# Patient Record
Sex: MALE | Race: WHITE | NOT HISPANIC OR LATINO | Employment: STUDENT | URBAN - METROPOLITAN AREA
[De-identification: names, ages, dates, MRNs, and addresses within clinical notes are randomized per-mention and may not be internally consistent; named-entity substitution may affect disease eponyms.]

---

## 2019-06-03 ENCOUNTER — OFFICE VISIT (OUTPATIENT)
Dept: RETAIL CLINIC | Facility: CLINIC | Age: 24
End: 2019-06-03

## 2019-06-03 VITALS
OXYGEN SATURATION: 98 % | RESPIRATION RATE: 20 BRPM | TEMPERATURE: 99.3 F | HEIGHT: 67 IN | WEIGHT: 167.8 LBS | BODY MASS INDEX: 26.34 KG/M2 | HEART RATE: 92 BPM

## 2019-06-03 DIAGNOSIS — J02.0 STREP THROAT: Primary | ICD-10-CM

## 2019-06-03 LAB
EXPIRATION DATE: ABNORMAL
EXPIRATION DATE: NORMAL
HETEROPH AB SER QL LA: NEGATIVE
INTERNAL CONTROL: ABNORMAL
INTERNAL CONTROL: NORMAL
Lab: ABNORMAL
Lab: NORMAL
S PYO AG THROAT QL: POSITIVE

## 2019-06-03 PROCEDURE — 87880 STREP A ASSAY W/OPTIC: CPT | Performed by: NURSE PRACTITIONER

## 2019-06-03 PROCEDURE — 86308 HETEROPHILE ANTIBODY SCREEN: CPT | Performed by: NURSE PRACTITIONER

## 2019-06-03 PROCEDURE — 99213 OFFICE O/P EST LOW 20 MIN: CPT | Performed by: NURSE PRACTITIONER

## 2019-06-03 RX ORDER — METHYLPREDNISOLONE 4 MG/1
TABLET ORAL
Qty: 21 TABLET | Refills: 0 | Status: SHIPPED | OUTPATIENT
Start: 2019-06-03

## 2019-06-03 RX ORDER — AMOXICILLIN 500 MG/1
1000 CAPSULE ORAL 2 TIMES DAILY
Qty: 40 CAPSULE | Refills: 0 | Status: SHIPPED | OUTPATIENT
Start: 2019-06-03 | End: 2019-06-13

## 2019-06-03 RX ORDER — BROMPHENIRAMINE MALEATE, PSEUDOEPHEDRINE HYDROCHLORIDE, AND DEXTROMETHORPHAN HYDROBROMIDE 2; 30; 10 MG/5ML; MG/5ML; MG/5ML
10 SYRUP ORAL 4 TIMES DAILY PRN
Qty: 200 ML | Refills: 0 | Status: SHIPPED | OUTPATIENT
Start: 2019-06-03 | End: 2019-06-08

## 2019-06-03 NOTE — PROGRESS NOTES
Subjective   Chief Complaint   Patient presents with   • Sore Throat       Genaro Reich is a 23 y.o. male.     Friend that was diagnosed with mono 2 days ago.       URI    This is a new problem. Episode onset: 3 days. The problem has been unchanged. Maximum temperature: 104. Associated symptoms include coughing (productive), headaches, joint pain and a sore throat. Pertinent negatives include no abdominal pain, congestion, diarrhea, nausea, rhinorrhea, sinus pain, sneezing, swollen glands, vomiting or wheezing. He has tried acetaminophen (aspirin, mucinex dm) for the symptoms. The treatment provided mild relief.        No Known Allergies    History reviewed. No pertinent past medical history.    History reviewed. No pertinent surgical history.    Social History     Socioeconomic History   • Marital status: Single     Spouse name: Not on file   • Number of children: Not on file   • Years of education: Not on file   • Highest education level: Not on file   Tobacco Use   • Smoking status: Never Smoker   Substance and Sexual Activity   • Alcohol use: No     Frequency: Never   • Drug use: No   • Sexual activity: Defer       Family History   Problem Relation Age of Onset   • No Known Problems Mother    • No Known Problems Father          Current Outpatient Medications:   •  amoxicillin (AMOXIL) 500 MG capsule, Take 2 capsules by mouth 2 (Two) Times a Day for 10 days., Disp: 40 capsule, Rfl: 0  •  brompheniramine-pseudoephedrine-DM 30-2-10 MG/5ML syrup, Take 10 mL by mouth 4 (Four) Times a Day As Needed for Congestion or Cough for up to 5 days., Disp: 200 mL, Rfl: 0  •  methylPREDNISolone (MEDROL, TATE,) 4 MG tablet, Take as directed on package instructions., Disp: 21 tablet, Rfl: 0      Review of Systems   Constitutional: Positive for chills, diaphoresis, fatigue and fever.   HENT: Positive for postnasal drip and sore throat. Negative for congestion, rhinorrhea and sneezing.    Respiratory: Positive for cough  "(productive). Negative for shortness of breath and wheezing.    Gastrointestinal: Negative for abdominal pain, diarrhea, nausea and vomiting.   Musculoskeletal: Positive for joint pain and myalgias.   Neurological: Positive for headache.        Vitals:    06/03/19 1104   Pulse: 92   Resp: 20   Temp: 99.3 °F (37.4 °C)   SpO2: 98%   Weight: 76.1 kg (167 lb 12.8 oz)   Height: 170.2 cm (67\")       Objective   Physical Exam   Constitutional: He is oriented to person, place, and time. He appears well-developed and well-nourished.   HENT:   Head: Normocephalic.   Right Ear: Tympanic membrane, external ear and ear canal normal.   Left Ear: Tympanic membrane, external ear and ear canal normal.   Nose: Nose normal.   Mouth/Throat: Mucous membranes are normal. Posterior oropharyngeal erythema present. No tonsillar exudate.   Eyes: Conjunctivae are normal.   Cardiovascular: Normal rate, regular rhythm, S1 normal, S2 normal and normal heart sounds.   Pulmonary/Chest: Effort normal and breath sounds normal.   Abdominal: Soft. Normal appearance. There is no tenderness.   Lymphadenopathy:     He has no cervical adenopathy.   Neurological: He is alert and oriented to person, place, and time.        Procedures     Assessment/Plan   Genaro was seen today for sore throat.    Diagnoses and all orders for this visit:    Strep throat  -     methylPREDNISolone (MEDROL, TATE,) 4 MG tablet; Take as directed on package instructions.  -     brompheniramine-pseudoephedrine-DM 30-2-10 MG/5ML syrup; Take 10 mL by mouth 4 (Four) Times a Day As Needed for Congestion or Cough for up to 5 days.  -     amoxicillin (AMOXIL) 500 MG capsule; Take 2 capsules by mouth 2 (Two) Times a Day for 10 days.  -     POCT Infectious mononucleosis antibody  -     POCT rapid strep A        Results for orders placed or performed in visit on 06/03/19   POCT Infectious mononucleosis antibody   Result Value Ref Range    Monospot Negative Negative    Internal Control Passed " Passed    Lot Number [id number]     Expiration Date 10/31/20    POCT rapid strep A   Result Value Ref Range    Rapid Strep A Screen Positive (A) Negative, VALID, INVALID, Not Performed    Internal Control Passed Passed    Lot Number [id number]     Expiration Date 10/14/21        PLAN: Discussed dosing, side effects, recommended other symptomatic care.  Patient should follow up with primary care provider if symptoms worsen, fail to resolve or other symptoms need attention. Patient/family agree to the above. Advised to alternate tylenol and motrin for pain and/or fever, stay hydrated and rest.     An After Visit Summary was printed and given to the patient.      Annita Villa, APRN

## 2019-08-12 ENCOUNTER — OFFICE VISIT (OUTPATIENT)
Dept: RETAIL CLINIC | Facility: CLINIC | Age: 24
End: 2019-08-12

## 2019-08-12 VITALS
TEMPERATURE: 98.5 F | HEART RATE: 74 BPM | SYSTOLIC BLOOD PRESSURE: 105 MMHG | OXYGEN SATURATION: 97 % | WEIGHT: 164 LBS | RESPIRATION RATE: 20 BRPM | HEIGHT: 67 IN | DIASTOLIC BLOOD PRESSURE: 60 MMHG | BODY MASS INDEX: 25.74 KG/M2

## 2019-08-12 DIAGNOSIS — R19.7 DIARRHEA, UNSPECIFIED TYPE: Primary | ICD-10-CM

## 2019-08-12 PROCEDURE — 99213 OFFICE O/P EST LOW 20 MIN: CPT | Performed by: NURSE PRACTITIONER

## 2019-08-12 NOTE — PROGRESS NOTES
Subjective   Genaro Reich is a 24 y.o. male.     Pt has noticed that his left tonsil is bigger than his right for 2 weeks without a sore throat, fever, fatigue, and diarrhea 12x for 24 hours. Ate spicy salad from a grocery store and had diarrhea.       Sore Throat    This is a new problem. The current episode started 1 to 4 weeks ago. The problem has been gradually worsening. Neither side of throat is experiencing more pain than the other. The maximum temperature recorded prior to his arrival was 101 - 101.9 F. The fever has been present for less than 1 day. The pain is at a severity of 5/10. The pain is moderate. Associated symptoms include diarrhea and swollen glands. Pertinent negatives include no abdominal pain, congestion, coughing, drooling, ear discharge, ear pain, headaches, hoarse voice, plugged ear sensation, neck pain, shortness of breath, stridor, trouble swallowing or vomiting. He has had no exposure to strep or mono. The treatment provided no relief.        Current Outpatient Medications on File Prior to Visit   Medication Sig Dispense Refill   • methylPREDNISolone (MEDROL, TATE,) 4 MG tablet Take as directed on package instructions. 21 tablet 0     No current facility-administered medications on file prior to visit.        No Known Allergies    History reviewed. No pertinent past medical history.    History reviewed. No pertinent surgical history.    Family History   Problem Relation Age of Onset   • No Known Problems Mother    • No Known Problems Father        Social History     Socioeconomic History   • Marital status: Single     Spouse name: Not on file   • Number of children: Not on file   • Years of education: Not on file   • Highest education level: Not on file   Tobacco Use   • Smoking status: Never Smoker   • Tobacco comment: in past did vape   Substance and Sexual Activity   • Alcohol use: No     Frequency: Never   • Drug use: No   • Sexual activity: Defer       Review of Systems  "  Constitutional: Positive for chills, diaphoresis, fatigue and fever. Negative for activity change and appetite change.   HENT: Positive for sore throat. Negative for congestion, drooling, ear discharge, ear pain, hoarse voice, postnasal drip, rhinorrhea, sinus pressure, sinus pain, sneezing, trouble swallowing and voice change.    Eyes: Negative for pain, discharge, redness and itching.   Respiratory: Negative for cough, chest tightness, shortness of breath and stridor.    Cardiovascular: Negative for chest pain.   Gastrointestinal: Positive for diarrhea. Negative for abdominal pain, nausea and vomiting.   Musculoskeletal: Positive for myalgias. Negative for arthralgias and neck pain.   Skin: Negative for color change and rash.   Allergic/Immunologic: Positive for environmental allergies.   Neurological: Negative for dizziness, light-headedness and headaches.   Psychiatric/Behavioral: Negative for agitation.       /60   Pulse 74   Temp 98.5 °F (36.9 °C) (Oral)   Resp 20   Ht 170.2 cm (67\")   Wt 74.4 kg (164 lb)   SpO2 97%   BMI 25.69 kg/m²     Objective   Physical Exam   Constitutional: He is oriented to person, place, and time. He appears well-developed and well-nourished. He is cooperative. He appears ill.   HENT:   Head: Normocephalic and atraumatic.   Right Ear: Tympanic membrane, external ear and ear canal normal.   Left Ear: Tympanic membrane, external ear and ear canal normal.   Nose: Nose normal. Right sinus exhibits no maxillary sinus tenderness and no frontal sinus tenderness. Left sinus exhibits no maxillary sinus tenderness and no frontal sinus tenderness.   Mouth/Throat: Uvula is midline and mucous membranes are normal. Posterior oropharyngeal erythema present. No oropharyngeal exudate or posterior oropharyngeal edema. Tonsils are 0 on the right. Tonsils are 1+ on the left.   Eyes: Conjunctivae and lids are normal.   Cardiovascular: Normal heart sounds.   Pulmonary/Chest: Effort normal " and breath sounds normal.   Abdominal: Soft. Bowel sounds are increased. There is no tenderness. No hernia.   Lymphadenopathy:     He has cervical adenopathy.   Neurological: He is alert and oriented to person, place, and time.   Skin: Skin is warm and dry. No rash noted.   Psychiatric: He has a normal mood and affect. His speech is normal and behavior is normal.       Procedures None    Assessment/Plan   Diagnoses and all orders for this visit:    Diarrhea, unspecified type          Follow up with PCP or go to the nearest emergency room if symptoms worsen or fail to improve.

## 2019-08-12 NOTE — PATIENT INSTRUCTIONS
Diarrhea, Adult  Diarrhea is when you have loose and water poop (stool) often. Diarrhea can make you feel weak and cause you to get dehydrated. Dehydration can make you tired and thirsty, make you have a dry mouth, and make it so you pee (urinate) less often. Diarrhea often lasts 2-3 days. However, it can last longer if it is a sign of something more serious. It is important to treat your diarrhea as told by your doctor.  Follow these instructions at home:  Eating and drinking  Follow these recommendations as told by your doctor:  · Take an oral rehydration solution (ORS). This is a drink that is sold at pharmacies and stores.  · Drink clear fluids, such as:  ? Water.  ? Ice chips.  ? Diluted fruit juice.  ? Low-calorie sports drinks.  · Eat bland, easy-to-digest foods in small amounts as you are able. These foods include:  ? Bananas.  ? Applesauce.  ? Rice.  ? Low-fat (lean) meats.  ? Toast.  ? Crackers.  · Avoid drinking fluids that have a lot of sugar or caffeine in them.  · Avoid alcohol.  · Avoid spicy or fatty foods.    General instructions  · Drink enough fluid to keep your pee (urine) clear or pale yellow.  · Wash your hands often. If you cannot use soap and water, use hand .  · Make sure that all people in your home wash their hands well and often.  · Take over-the-counter and prescription medicines only as told by your doctor.  · Rest at home while you get better.  · Watch your condition for any changes.  · Take a warm bath to help with any burning or pain from having diarrhea.  · Keep all follow-up visits as told by your doctor. This is important.  Contact a doctor if:  · You have a fever.  · Your diarrhea gets worse.  · You have new symptoms.  · You cannot keep fluids down.  · You feel light-headed or dizzy.  · You have a headache.  · You have muscle cramps.  Get help right away if:  · You have chest pain.  · You feel very weak or you pass out (faint).  · You have bloody or black poop or poop  that look like tar.  · You have very bad pain, cramping, or bloating in your belly (abdomen).  · You have trouble breathing or you are breathing very quickly.  · Your heart is beating very quickly.  · Your skin feels cold and clammy.  · You feel confused.  · You have signs of dehydration, such as:  ? Dark pee, hardly any pee, or no pee.  ? Cracked lips.  ? Dry mouth.  ? Sunken eyes.  ? Sleepiness.  ? Weakness.  This information is not intended to replace advice given to you by your health care provider. Make sure you discuss any questions you have with your health care provider.  Document Released: 06/05/2009 Document Revised: 08/22/2018 Document Reviewed: 08/23/2016  [employer] Patient Education © 2019 [employer]    Food Choices to Help Relieve Diarrhea, Adult  When you have diarrhea, the foods you eat and your eating habits are very important. Choosing the right foods and drinks can help:  · Relieve diarrhea.  · Replace lost fluids and nutrients.  · Prevent dehydration.  What general guidelines should I follow?    Relieving diarrhea  · Choose foods with less than 2 g or .07 oz. of fiber per serving.  · Limit fats to less than 8 tsp (38 g or 1.34 oz.) a day.  · Avoid the following:  ? Foods and beverages sweetened with high-fructose corn syrup, honey, or sugar alcohols such as xylitol, sorbitol, and mannitol.  ? Foods that contain a lot of fat or sugar.  ? Fried, greasy, or spicy foods.  ? High-fiber grains, breads, and cereals.  ? Raw fruits and vegetables.  · Eat foods that are rich in probiotics. These foods include dairy products such as yogurt and fermented milk products. They help increase healthy bacteria in the stomach and intestines (gastrointestinal tract, or GI tract).  · If you have lactose intolerance, avoid dairy products. These may make your diarrhea worse.  · Take medicine to help stop diarrhea (antidiarrheal medicine) only as told by your health care provider.  Replacing  nutrients  · Eat small meals or snacks every 3-4 hours.  · Eat bland foods, such as white rice, toast, or baked potato, until your diarrhea starts to get better. Gradually reintroduce nutrient-rich foods as tolerated or as told by your health care provider. This includes:  ? Well-cooked protein foods.  ? Peeled, seeded, and soft-cooked fruits and vegetables.  ? Low-fat dairy products.  · Take vitamin and mineral supplements as told by your health care provider.  Preventing dehydration  · Start by sipping water or a special solution to prevent dehydration (oral rehydration solution, ORS). Urine that is clear or pale yellow means that you are getting enough fluid.  · Try to drink at least 8-10 cups of fluid each day to help replace lost fluids.  · You may add other liquids in addition to water, such as clear juice or decaffeinated sports drinks, as tolerated or as told by your health care provider.  · Avoid drinks with caffeine, such as coffee, tea, or soft drinks.  · Avoid alcohol.  What foods are recommended?    The items listed may not be a complete list. Talk with your health care provider about what dietary choices are best for you.  Grains  White rice. White, [place], or manda breads (fresh or toasted), including plain rolls, buns, or bagels. White pasta. Saltine, soda, or tommy crackers. Pretzels. Low-fiber cereal. Cooked cereals made with water (such as cornmeal, farina, or cream cereals). Plain muffins. Matzo. [place] toast. Zwieback.  Vegetables  Potatoes (without the skin). Most well-cooked and canned vegetables without skins or seeds. Tender lettuce.  Fruits  Apple sauce. Fruits canned in juice. Cooked apricots, cherries, grapefruit, peaches, pears, or plums. Fresh bananas and cantaloupe.  Meats and other protein foods  Baked or boiled chicken. Eggs. Tofu. Fish. Seafood. Smooth nut butters. Ground or well-cooked tender beef, ham, veal, lamb, pork, or poultry.  Dairy  Plain yogurt, kefir, and unsweetened  liquid yogurt. Lactose-free milk, buttermilk, skim milk, or soy milk. Low-fat or nonfat hard cheese.  Beverages  Water. Low-calorie sports drinks. Fruit juices without pulp. Strained tomato and vegetable juices. Decaffeinated teas. Sugar-free beverages not sweetened with sugar alcohols. Oral rehydration solutions, if approved by your health care provider.  Seasoning and other foods  Bouillon, broth, or soups made from recommended foods.  What foods are not recommended?  The items listed may not be a complete list. Talk with your health care provider about what dietary choices are best for you.  Grains  Whole grain, whole wheat, bran, or rye breads, rolls, pastas, and crackers. Wild or brown rice. Whole grain or bran cereals. Barley. Oats and oatmeal. Corn tortillas or taco shells. Granola. Popcorn.  Vegetables  Raw vegetables. Fried vegetables. Cabbage, broccoli, [place] sprouts, artichokes, baked beans, beet greens, corn, kale, legumes, peas, sweet potatoes, and yams. Potato skins. Cooked spinach and cabbage.  Fruits  Dried fruit, including raisins and dates. Raw fruits. Stewed or dried prunes. Canned fruits with syrup.  Meat and other protein foods  Fried or fatty meats. Deli meats. [place] nut butters. Nuts and seeds. Beans and lentils. [place]. Hot dogs. Sausage.  Dairy  High-fat cheeses. Whole milk, chocolate milk, and beverages made with milk, such as milk shakes. Half-and-half. Cream. sour cream. Ice cream.  Beverages  Caffeinated beverages (such as coffee, tea, soda, or energy drinks). Alcoholic beverages. Fruit juices with pulp. Prune juice. Soft drinks sweetened with high-fructose corn syrup or sugar alcohols. High-calorie sports drinks.  Fats and oils  Butter. Cream sauces. Margarine. Salad oils. Plain salad dressings. Olives. Avocados. Mayonnaise.  Sweets and desserts  Sweet rolls, doughnuts, and sweet breads. Sugar-free desserts sweetened with sugar alcohols such as xylitol and sorbitol.  Seasoning and  other foods  Honey. Hot sauce. Chili powder. Gravy. Cream-based or milk-based soups. Pancakes and waffles.  Summary  · When you have diarrhea, the foods you eat and your eating habits are very important.  · Make sure you get at least 8-10 cups of fluid each day, or enough to keep your urine clear or pale yellow.  · Eat bland foods and gradually reintroduce healthy, nutrient-rich foods as tolerated, or as told by your health care provider.  · Avoid high-fiber, fried, greasy, or spicy foods.  This information is not intended to replace advice given to you by your health care provider. Make sure you discuss any questions you have with your health care provider.  Document Released: 03/09/2005 Document Revised: 12/15/2017 Document Reviewed: 12/15/2017  [employer] Interactive Patient Education © 2019 [employer]